# Patient Record
Sex: MALE | Race: WHITE | NOT HISPANIC OR LATINO | Employment: FULL TIME | URBAN - METROPOLITAN AREA
[De-identification: names, ages, dates, MRNs, and addresses within clinical notes are randomized per-mention and may not be internally consistent; named-entity substitution may affect disease eponyms.]

---

## 2021-03-03 ENCOUNTER — TRANSCRIBE ORDERS (OUTPATIENT)
Dept: PHYSICAL THERAPY | Facility: CLINIC | Age: 41
End: 2021-03-03

## 2021-03-03 ENCOUNTER — EVALUATION (OUTPATIENT)
Dept: PHYSICAL THERAPY | Facility: CLINIC | Age: 41
End: 2021-03-03
Payer: COMMERCIAL

## 2021-03-03 DIAGNOSIS — M77.10 LATERAL EPICONDYLITIS, UNSPECIFIED LATERALITY: Primary | ICD-10-CM

## 2021-03-03 PROCEDURE — 97161 PT EVAL LOW COMPLEX 20 MIN: CPT | Performed by: PHYSICAL THERAPIST

## 2021-03-03 PROCEDURE — 97140 MANUAL THERAPY 1/> REGIONS: CPT | Performed by: PHYSICAL THERAPIST

## 2021-03-03 NOTE — LETTER
2021    [address]    Patient: Jayla Ocampo   YOB: 1980   Date of Visit: 3/3/2021     Encounter Diagnosis     ICD-10-CM    1  Lateral epicondylitis, unspecified laterality  M77 10        Dear Dr Cale Yo: Thank you for your recent referral of Jayla Ocampo  Please review the attached evaluation summary from Hang's recent visit  Please verify that you agree with the plan of care by signing the attached order  If you have any questions or concerns, please do not hesitate to call  I sincerely appreciate the opportunity to share in the care of one of your patients and hope to have another opportunity to work with you in the near future  Sincerely,    Theuyen Jean Baptiste PT      Referring Provider:      I certify that I have read the below Plan of Care and certify the need for these services furnished under this plan of treatment while under my care  Marci Jacome MD  [facility] [phone]  Via Fax: [phone]          PT Evaluation     Today's date: 3/3/2021  Patient name: Jayla Ocampo  : 1980  MRN: [id number]  Referring provider: Devan Merritt MD  Dx:   Encounter Diagnosis     ICD-10-CM    1  Lateral epicondylitis, unspecified laterality  M77 10                   Assessment  Assessment details: Jayla Ocampo is a 41 y o  male who presents with pain, decreased strength, decreased ROM and decreased joint mobility  Due to these impairments, patient has difficulty performing ADL's, recreational activities, work-related activities, lifting/carrying  Patient's clinical presentation is consistent with their referring diagnosis of Lateral epicondylitis, unspecified laterality  (primary encounter diagnosis)  Patient has been educated in home exercise program and plan of care   Patient would benefit from skilled physical therapy services to address their aforementioned functional limitations and progress towards prior level of function and independence with home exercise program    Impairments: abnormal or restricted ROM, abnormal movement, activity intolerance, impaired physical strength, lacks appropriate home exercise program, pain with function, poor posture  and poor body mechanics  Functional limitations: golf, typing, painting walls, chopping wood, pinching dog lease, /grasp   Prognosis: good  Prognosis details: + factors: young age and active lifestyle  - factors: chronicity of pain    Goals  Short Term Goals to be accomplished in 4 weeks:  STG1: Pt will be I with HEP  STG2: : Pt will demo full elbow and wrist AROM to improve self care (showering) and household ADLs (opening car door)  STG3: Pt will demo 4-/5 MMT strength in elbow and wrist to improve grasping  Long Term Goals to be accomplished in 10 weeks:   LTG1: Pt will demo  strength to 100 lbs to match uninvolved side and restore grasping ability  LTG2: Pt will return to work/household ADLs pain free as per PLOF including home renovation and painting  LTG3: Pt will demo elbow and wrist strength WNL to allow lifting/carrying per PLOF  Plan  Plan details: HEP development, stretching, strengthening, A/AA/PROM, joint mobilizations, posture education, STM/MI as needed to reduce muscle tension, muscle reeducation, PLOC discussed and agreed upon with patient      Patient would benefit from: PT eval and skilled physical therapy  Planned modality interventions: cryotherapy, thermotherapy: hydrocollator packs and unattended electrical stimulation  Planned therapy interventions: home exercise program, therapeutic exercise, therapeutic activities, self care, patient education, manual therapy, neuromuscular re-education, balance, strengthening, stretching, flexibility and joint mobilization  Frequency: 2x week  Duration in weeks: 10  Plan of Care beginning date: 3/3/2021  Plan of Care expiration date: 2021  Treatment plan discussed with: patient        Subjective Evaluation    History of Present Illness  Mechanism of injury: Pt reported that he has been having pain since the middle of October  Stated that it started immediately after stacking 2 truck loads of firewood  Stated that it started as muscle sores and r elbow pain  He thought pain would go away, but over the holidays pain was annoying but not horrible  Over the past few weeks the symptoms got worse  He went to ortho who prescribed PT  Pain  Current pain ratin  At best pain ratin  At worst pain ratin  Location: R lateral epicondyle  Quality: sharp, throbbing, radiating, pressure, knife-like and discomfort  Relieving factors: relaxation and rest  Aggravating factors: lifting    Treatments  Current treatment: physical therapy  Patient Goals  Patient goals for therapy: decreased pain, increased motion, return to sport/leisure activities, independence with ADLs/IADLs, increased strength and return to work  Patient goal: golf, typing, painting walls, chopping wood, pinching dog lease, /grasp        Objective     Observations     Additional Observation Details  Lacks extension at rest    Tenderness     Right Elbow   Tenderness in the radial head  Passive Range of Motion     Left Elbow   Flexion: 75 degrees   Extension: 75 degrees     Right Elbow   Flexion: 45 degrees with pain  Extension: 35 degrees with pain    Additional Passive Range of Motion Details    Joint Play     Right Elbow   Hypomobile in the proximal radioulnar joint  Additional Joint Play Details    Strength/Myotome Testing     Left Elbow   Flexion: 5  Extension: 5    Right Elbow   Flexion: 4  Extension: 4    Additional Strength Details  MMT   Wrist flexion  R: 4/5 L: 5/5    Wrist extension:   R: 3+/5 L: 5/5     Dynamometry: R: 15 lbs L: 100 lbs     Tests     Right Elbow   Positive Cozen's and Mill's  Additional Tests Details    Precautions: standard      Manuals 3/3            jt mobs  JZ            STM, PROM stretch JZ                                      Neuro Re-Ed 3/3                                                                                                       Ther Ex 3/3            Wrist flex stretch :15x2            W' ext stretch :15x2            Ulnar deviation stretch :15x2                                                                             Ther Activity 3/3                                                                             Modalities 3/3                         CT

## 2021-03-03 NOTE — PROGRESS NOTES
PT Evaluation     Today's date: 3/3/2021  Patient name: Ginette Mcgrath  : 1980  MRN: [id number]  Referring provider: Ivania Bonilla MD  Dx:   Encounter Diagnosis     ICD-10-CM    1  Lateral epicondylitis, unspecified laterality  M77 10                   Assessment  Assessment details: Ginette Mcgrath is a 41 y o  male who presents with pain, decreased strength, decreased ROM and decreased joint mobility  Due to these impairments, patient has difficulty performing ADL's, recreational activities, work-related activities, lifting/carrying  Patient's clinical presentation is consistent with their referring diagnosis of Lateral epicondylitis, unspecified laterality  (primary encounter diagnosis)  Patient has been educated in home exercise program and plan of care  Patient would benefit from skilled physical therapy services to address their aforementioned functional limitations and progress towards prior level of function and independence with home exercise program    Impairments: abnormal or restricted ROM, abnormal movement, activity intolerance, impaired physical strength, lacks appropriate home exercise program, pain with function, poor posture  and poor body mechanics  Functional limitations: golf, typing, painting walls, chopping wood, pinching dog lease, /grasp   Prognosis: good  Prognosis details: + factors: young age and active lifestyle  - factors: chronicity of pain    Goals  Short Term Goals to be accomplished in 4 weeks:  STG1: Pt will be I with HEP  STG2: : Pt will demo full elbow and wrist AROM to improve self care (showering) and household ADLs (opening car door)  STG3: Pt will demo 4-/5 MMT strength in elbow and wrist to improve grasping  Long Term Goals to be accomplished in 10 weeks:   LTG1: Pt will demo  strength to 100 lbs to match uninvolved side and restore grasping ability     LTG2: Pt will return to work/household ADLs pain free as per PLOF including home renovation and painting  LTG3: Pt will demo elbow and wrist strength WNL to allow lifting/carrying per PLOF  Plan  Plan details: HEP development, stretching, strengthening, A/AA/PROM, joint mobilizations, posture education, STM/MI as needed to reduce muscle tension, muscle reeducation, PLOC discussed and agreed upon with patient  Patient would benefit from: PT eval and skilled physical therapy  Planned modality interventions: cryotherapy, thermotherapy: hydrocollator packs and unattended electrical stimulation  Planned therapy interventions: home exercise program, therapeutic exercise, therapeutic activities, self care, patient education, manual therapy, neuromuscular re-education, balance, strengthening, stretching, flexibility and joint mobilization  Frequency: 2x week  Duration in weeks: 10  Plan of Care beginning date: 3/3/2021  Plan of Care expiration date: 2021  Treatment plan discussed with: patient        Subjective Evaluation    History of Present Illness  Mechanism of injury: Pt reported that he has been having pain since the middle of October  Stated that it started immediately after stacking 2 truck loads of firewood  Stated that it started as muscle sores and r elbow pain  He thought pain would go away, but over the holidays pain was annoying but not horrible  Over the past few weeks the symptoms got worse  He went to ortho who prescribed PT     Pain  Current pain ratin  At best pain ratin  At worst pain ratin  Location: R lateral epicondyle  Quality: sharp, throbbing, radiating, pressure, knife-like and discomfort  Relieving factors: relaxation and rest  Aggravating factors: lifting    Treatments  Current treatment: physical therapy  Patient Goals  Patient goals for therapy: decreased pain, increased motion, return to sport/leisure activities, independence with ADLs/IADLs, increased strength and return to work  Patient goal: golf, typing, painting walls, chopping wood, pinching dog lease, /grasp        Objective     Observations     Additional Observation Details  Lacks extension at rest    Tenderness     Right Elbow   Tenderness in the radial head  Passive Range of Motion     Left Elbow   Flexion: 75 degrees   Extension: 75 degrees     Right Elbow   Flexion: 45 degrees with pain  Extension: 35 degrees with pain    Additional Passive Range of Motion Details    Joint Play     Right Elbow   Hypomobile in the proximal radioulnar joint  Additional Joint Play Details    Strength/Myotome Testing     Left Elbow   Flexion: 5  Extension: 5    Right Elbow   Flexion: 4  Extension: 4    Additional Strength Details  MMT   Wrist flexion  R: 4/5 L: 5/5    Wrist extension:   R: 3+/5 L: 5/5     Dynamometry: R: 15 lbs L: 100 lbs     Tests     Right Elbow   Positive Cozen's and Mill's  Additional Tests Details                 Precautions: standard      Manuals 3/3            jt mobs  JZ            STM, PROM stretch JZ                                      Neuro Re-Ed 3/3                                                                                                       Ther Ex 3/3            Wrist flex stretch :15x2            W' ext stretch :15x2            Ulnar deviation stretch :15x2                                                                             Ther Activity 3/3                                                                             Modalities 3/3                         CT

## 2021-03-10 ENCOUNTER — OFFICE VISIT (OUTPATIENT)
Dept: PHYSICAL THERAPY | Facility: CLINIC | Age: 41
End: 2021-03-10
Payer: COMMERCIAL

## 2021-03-10 DIAGNOSIS — M77.10 LATERAL EPICONDYLITIS, UNSPECIFIED LATERALITY: Primary | ICD-10-CM

## 2021-03-10 PROCEDURE — 97110 THERAPEUTIC EXERCISES: CPT

## 2021-03-10 PROCEDURE — 97140 MANUAL THERAPY 1/> REGIONS: CPT

## 2021-03-10 NOTE — PROGRESS NOTES
Daily Note     Today's date: 3/10/2021  Patient name: Zuhair Manuel  : 1980  MRN: [id number]  Referring provider: Joie Pandya MD  Dx:   Encounter Diagnosis     ICD-10-CM    1  Lateral epicondylitis, unspecified laterality  M77 10                   Subjective: Pt reports symptoms continue to fluctuate  Reports typing and straightening elbow after prolonged positioning continue to be bothersome  Objective: See treatment diary below      Assessment: Tolerated treatment well  Pt favored IASTM for symptom relief, soft tissue restriction noted via erythema  Concordant pain provoked during gripping w/ elbow extended  Lateral gapping had no effect on symptoms, distract+lateral gap provided moderate relief  Therefore, performed MWM to desensitize area and appropriately load w/ decreased pain  Pt educated on maintaining neutral wrist during gripping to decrease pain as well as demonstrated this pattern  Advised pt to utilize towel roll under wrist while working to maintain neutral wrist position  Ended session w/ cupping to provide relief similar to IASTM, pt educated on integumentary changes and verbalized consent  Denied pain w/ initiation of eccentric exercises  Patient would benefit from continued PT      Plan: Continue per plan of care        Precautions: standard      Manuals 3/3 3/10           jt mobs  JZ BMG           STM, PROM stretch JZ IASTM BMG           Cupping lateral epicondylitis  BMG           MWM G digiflex distract/lat gap  BMG 2x10           Neuro Re-Ed 3/3 3/10                                                                                                      Ther Ex 3/3 3/10           Wrist flex stretch :15x2            W' ext stretch :15x2            Ulnar deviation stretch :15x2            Ecc  Wrist ext  2# 3x10                                                               Ther Activity 3/3                                                                             Modalities 3/3 3/10             x8' pre           CT

## 2021-03-15 ENCOUNTER — APPOINTMENT (OUTPATIENT)
Dept: PHYSICAL THERAPY | Facility: CLINIC | Age: 41
End: 2021-03-15
Payer: COMMERCIAL

## 2021-03-17 ENCOUNTER — OFFICE VISIT (OUTPATIENT)
Dept: PHYSICAL THERAPY | Facility: CLINIC | Age: 41
End: 2021-03-17
Payer: COMMERCIAL

## 2021-03-17 DIAGNOSIS — M77.10 LATERAL EPICONDYLITIS, UNSPECIFIED LATERALITY: Primary | ICD-10-CM

## 2021-03-17 PROCEDURE — 97110 THERAPEUTIC EXERCISES: CPT | Performed by: PHYSICAL THERAPIST

## 2021-03-17 PROCEDURE — 97140 MANUAL THERAPY 1/> REGIONS: CPT | Performed by: PHYSICAL THERAPIST

## 2021-03-17 NOTE — PROGRESS NOTES
Daily Note     Today's date: 3/17/2021  Patient name: Cole Acuña  : 1980  MRN: [id number]  Referring provider: Marco Collier MD  Dx:   Encounter Diagnosis     ICD-10-CM    1  Lateral epicondylitis, unspecified laterality  M77 10             Subjective: Pt reported that he is feeling some improvement in flexibility and pain, however he remains significantly limited  Objective: See treatment diary below    Assessment: Tolerated treatment well  Increased resistance with eccentric wrist extension, which he performed well  Patient demonstrated fatigue post treatment, exhibited good technique with therapeutic exercises and would benefit from continued PT      Plan: Continue per plan of care        Precautions: standard      Manuals 3/3 3/10 3/17          jt mobs  JZ BMG JZ          STM, PROM stretch JZ IASTM BMG JZ          Cupping lateral epicondylitis  BMG           MWM G digiflex distract/lat gap  BMG 2x10           Neuro Re-Ed 3/3 3/10                                                                                                      Ther Ex 3/3 3/10 3/17          Wrist flex stretch :15x2  :15x2          W' ext stretch :15x2            Ulnar deviation stretch :15x2            Ecc  Wrist ext  2# 3x10 8# 3x10          Flexbar extension   3x10                                                 Ther Activity 3/3  3/17                                                                           Modalities 3/3 3/10 3/17          MH  x8' pre           CT

## 2021-03-22 ENCOUNTER — APPOINTMENT (OUTPATIENT)
Dept: PHYSICAL THERAPY | Facility: CLINIC | Age: 41
End: 2021-03-22
Payer: COMMERCIAL

## 2021-03-24 ENCOUNTER — OFFICE VISIT (OUTPATIENT)
Dept: PHYSICAL THERAPY | Facility: CLINIC | Age: 41
End: 2021-03-24
Payer: COMMERCIAL

## 2021-03-24 DIAGNOSIS — M77.10 LATERAL EPICONDYLITIS, UNSPECIFIED LATERALITY: Primary | ICD-10-CM

## 2021-03-24 PROCEDURE — 97140 MANUAL THERAPY 1/> REGIONS: CPT | Performed by: PHYSICAL THERAPIST

## 2021-03-24 PROCEDURE — 97110 THERAPEUTIC EXERCISES: CPT | Performed by: PHYSICAL THERAPIST

## 2021-03-24 NOTE — PROGRESS NOTES
Daily Note     Today's date: 3/24/2021  Patient name: Lashanda Brown  : 1980  MRN: [id number]  Referring provider: Lali Sosa MD  Dx:   Encounter Diagnosis     ICD-10-CM    1  Lateral epicondylitis, unspecified laterality  M77 10             Subjective: Pt reported that "My pain is actually starting to get a little bit better "     Objective: See treatment diary below    Assessment: Tolerated treatment well  Patient demonstrated fatigue post treatment, exhibited good technique with therapeutic exercises and would benefit from continued PT  Plan: Continue per plan of care        Precautions: standard    Manuals 3/3 3/10 3/17 3/24         jt mobs  JZ BMG JZ JZ         STM, PROM stretch JZ IASTM BMG JZ JZ         Cupping lateral epicondylitis  BMG           MWM G digiflex distract/lat gap  BMG 2x10           Neuro Re-Ed 3/3 3/10 3/17 3/24                                                                                                    Ther Ex 3/3 3/10 3/17 3/24         Wrist flex stretch :15x2  :15x2          W' ext stretch :15x2            Ulnar deviation stretch :15x2            Ecc  Wrist ext  2# 3x10 8# 3x10          Flexbar extension   3x10          Prone  bicep curl    3/ 3x10         Neutral  bicep curl    7/ 3x10         [place] tricep overhead    4/ 3x10         Updated HEP    5'          Ther Activity 3/3  3/17 3/24                                                                          Modalities 3/3 3/10 3/17 3/24         MH  x8' pre           CT

## 2021-03-29 ENCOUNTER — APPOINTMENT (OUTPATIENT)
Dept: PHYSICAL THERAPY | Facility: CLINIC | Age: 41
End: 2021-03-29
Payer: COMMERCIAL

## 2021-03-31 ENCOUNTER — OFFICE VISIT (OUTPATIENT)
Dept: PHYSICAL THERAPY | Facility: CLINIC | Age: 41
End: 2021-03-31
Payer: COMMERCIAL

## 2021-03-31 DIAGNOSIS — M77.10 LATERAL EPICONDYLITIS, UNSPECIFIED LATERALITY: Primary | ICD-10-CM

## 2021-03-31 PROCEDURE — 97110 THERAPEUTIC EXERCISES: CPT | Performed by: PHYSICAL THERAPIST

## 2021-03-31 PROCEDURE — 97140 MANUAL THERAPY 1/> REGIONS: CPT | Performed by: PHYSICAL THERAPIST

## 2021-03-31 NOTE — PROGRESS NOTES
Daily Note     Today's date: 3/31/2021  Patient name: Johana Gaffney  : 1980  MRN: [id number]  Referring provider: Ami Andersen MD  Dx:   Encounter Diagnosis     ICD-10-CM    1  Lateral epicondylitis, unspecified laterality  M77 10             Subjective: Pt reported that he is progressing as the sessions continue  Noted that "I am actually getting better "     Objective: See treatment diary below    Assessment: Tolerated treatment well  Patient demonstrated fatigue post treatment, exhibited good technique with therapeutic exercises and would benefit from continued PT  Plan: Continue per plan of care  Precautions: standard    Manuals 3/3 3/10 3/17 3/24 3/31        jt mobs  JZ BMG JZ JZ JZ        STM, PROM stretch JZ IASTM BMG JZ JZ JZ        Cupping lateral epicondylitis  BMG           MWM G digiflex distract/lat gap  BMG 2x10           Neuro Re-Ed 3/3 3/10 3/17 3/24 3/31                                                                                                   Ther Ex 3/3 3/10 3/17 3/24 3/31        Wrist flex stretch :15x2  :15x2                       Wrist ext     7# 3x10        Ecc   Wrist ext  2# 3x10 8# 3x10  10# 3x10        Flexbar extension   3x10          Prone  bicep curl    3/ 3x10 4/ 3x10        Neutral  bicep curl    7/ 3x10 9/ 3x10        [place] tricep overhead    4/ 3x10 4/ 3x10        Updated HEP    5'          Ther Activity 3/3  3/17 3/24 3/31                                                                         Modalities 3/3 3/10 3/17 3/24 3/31        MH  x8' pre           CT

## 2021-04-05 ENCOUNTER — APPOINTMENT (OUTPATIENT)
Dept: PHYSICAL THERAPY | Facility: CLINIC | Age: 41
End: 2021-04-05
Payer: COMMERCIAL

## 2021-04-07 ENCOUNTER — OFFICE VISIT (OUTPATIENT)
Dept: PHYSICAL THERAPY | Facility: CLINIC | Age: 41
End: 2021-04-07
Payer: COMMERCIAL

## 2021-04-07 DIAGNOSIS — M77.10 LATERAL EPICONDYLITIS, UNSPECIFIED LATERALITY: Primary | ICD-10-CM

## 2021-04-07 PROCEDURE — 97140 MANUAL THERAPY 1/> REGIONS: CPT | Performed by: PHYSICAL THERAPIST

## 2021-04-07 PROCEDURE — 97110 THERAPEUTIC EXERCISES: CPT | Performed by: PHYSICAL THERAPIST

## 2021-04-08 NOTE — PROGRESS NOTES
Daily Note     Today's date: 2021  Patient name: Grzegorz Kumari  : 1980  MRN: [id number]  Referring provider: Rachel Davila MD  Dx:   Encounter Diagnosis     ICD-10-CM    1  Lateral epicondylitis, unspecified laterality  M77 10             Subjective: Pt reported that he is "getting better " Notes that he will likely be holding PT "at least for a little due to costs  Objective: See treatment diary below    Assessment: Tolerated treatment well  Focused treatment on eccentric loading of extensor tendon with elbow in extension, which he tolerated well, however did report significant levels of pain and he did present with significant weakness  Patient demonstrated fatigue post treatment, exhibited good technique with therapeutic exercises and would benefit from continued PT  Plan: Continue per plan of care  Precautions: standard    Manuals 3/3 3/10 3/17 3/24 3/31 4/7       jt mobs  JZ BMG JZ JZ JZ JZ       STM, PROM stretch JZ IASTM BMG JZ JZ JZ JZ       Cupping lateral epicondylitis  BMG           MWM G digiflex distract/lat gap  BMG 2x10           Neuro Re-Ed 3/3 3/10 3/17 3/24 3/31                                                                                                   Ther Ex 3/3 3/10 3/17 3/24 3/31 4/7       Wrist flex stretch :15x2  :15x2                       Wrist ext     7# 3x10 8# 3x10       Ecc   Wrist ext  2# 3x10 8# 3x10  10# 3x10 15# 3x10       Flexbar extension   3x10          Prone  bicep curl    3/ 3x10 4/ 3x10 4/ 3x10       Neutral  bicep curl    7/ 3x10 9/ 3x10 10/ 3x10       [place] tricep overhead    4/ 3x10 4/ 3x10 4/ 3x10       Updated HEP    5'          Ther Activity 3/3  3/17 3/24 3/31                                                                         Modalities 3/3 3/10 3/17 3/24 3/31        MH  x8' pre           CT

## 2025-03-17 ENCOUNTER — HOSPITAL ENCOUNTER (OUTPATIENT)
Dept: HOSPITAL 99 - GI | Age: 45
Discharge: HOME | End: 2025-03-17
Payer: COMMERCIAL

## 2025-03-17 DIAGNOSIS — K64.8: ICD-10-CM

## 2025-03-17 DIAGNOSIS — K57.30: ICD-10-CM

## 2025-03-17 DIAGNOSIS — Z12.11: Primary | ICD-10-CM
